# Patient Record
Sex: MALE
[De-identification: names, ages, dates, MRNs, and addresses within clinical notes are randomized per-mention and may not be internally consistent; named-entity substitution may affect disease eponyms.]

---

## 2019-08-05 PROBLEM — Z00.00 ENCOUNTER FOR PREVENTIVE HEALTH EXAMINATION: Status: ACTIVE | Noted: 2019-08-05

## 2019-08-26 ENCOUNTER — APPOINTMENT (OUTPATIENT)
Dept: NEUROLOGY | Facility: CLINIC | Age: 84
End: 2019-08-26
Payer: MEDICARE

## 2019-08-26 VITALS
HEART RATE: 50 BPM | HEIGHT: 72 IN | WEIGHT: 190 LBS | BODY MASS INDEX: 25.73 KG/M2 | SYSTOLIC BLOOD PRESSURE: 138 MMHG | TEMPERATURE: 97.1 F | DIASTOLIC BLOOD PRESSURE: 80 MMHG

## 2019-08-26 DIAGNOSIS — Z80.42 FAMILY HISTORY OF MALIGNANT NEOPLASM OF PROSTATE: ICD-10-CM

## 2019-08-26 DIAGNOSIS — Z80.9 FAMILY HISTORY OF MALIGNANT NEOPLASM, UNSPECIFIED: ICD-10-CM

## 2019-08-26 DIAGNOSIS — Z82.49 FAMILY HISTORY OF ISCHEMIC HEART DISEASE AND OTHER DISEASES OF THE CIRCULATORY SYSTEM: ICD-10-CM

## 2019-08-26 DIAGNOSIS — Z82.3 FAMILY HISTORY OF STROKE: ICD-10-CM

## 2019-08-26 DIAGNOSIS — I51.9 HEART DISEASE, UNSPECIFIED: ICD-10-CM

## 2019-08-26 DIAGNOSIS — G25.0 ESSENTIAL TREMOR: ICD-10-CM

## 2019-08-26 DIAGNOSIS — Z87.39 PERSONAL HISTORY OF OTHER DISEASES OF THE MUSCULOSKELETAL SYSTEM AND CONNECTIVE TISSUE: ICD-10-CM

## 2019-08-26 DIAGNOSIS — H83.2X1 LABYRINTHINE DYSFUNCTION, RIGHT EAR: ICD-10-CM

## 2019-08-26 PROCEDURE — 99205 OFFICE O/P NEW HI 60 MIN: CPT

## 2019-08-26 RX ORDER — APIXABAN 5 MG/1
5 TABLET, FILM COATED ORAL
Refills: 0 | Status: ACTIVE | COMMUNITY

## 2019-08-26 RX ORDER — METOPROLOL TARTRATE 50 MG/1
50 TABLET, FILM COATED ORAL
Refills: 0 | Status: ACTIVE | COMMUNITY

## 2019-08-26 RX ORDER — AMIODARONE HYDROCHLORIDE 200 MG/1
200 TABLET ORAL
Refills: 0 | Status: ACTIVE | COMMUNITY

## 2019-08-26 NOTE — REVIEW OF SYSTEMS
[Dizziness] : dizziness [Vertigo] : vertigo [Negative] : Endocrine [de-identified] : feel off balance, change in vision [FreeTextEntry2] : fatigue, weakness [FreeTextEntry4] : ringing in ears [FreeTextEntry8] : blood in urine, nighttime urination, erectile dysfunction [FreeTextEntry5] : irregular heart beat

## 2019-08-26 NOTE — CONSULT LETTER
[Dear  ___] : Dear  [unfilled], [FreeTextEntry1] : I had the pleasure of evaluating your patient, ROLAND BOWERS, Please see the assessment section below for a summary of my diagnostic impression and plan.\par \par Thank you very much for allowing me to participate in the care of this patient. If you have any questions, please do not hesitate to contact me.\par \par Sincerely,\par \par Dang Mace MD\par \par \par

## 2019-08-26 NOTE — ASSESSMENT
[FreeTextEntry1] : Mr. Fernandez is an 83-year-old man with right vestibular dysfunction.  He has already had a complete workup including ENT evaluation, VNG, MRI brain. He is also receiving the appropriate treatments with vestibular rehabilitation and doing home exercises as well.  Essential tremor and peripheral neuropathy are also contributing to his gait difficulty.  \par He will obtain and bring to us his previous records for my review.\par He will continue vestibular rehabilitation and home exercises.\par

## 2019-08-26 NOTE — PHYSICAL EXAM
[FreeTextEntry1] : Physical examination \par General: No acute distress, Awake, Alert.   \par Fundus: disc margins sharp.   \par Neck: no Carotid bruit.   \par Cardiovascular: Normal rate, Regular rhythm, No murmur.  \par \par Chest - no cardiac implant \par \par Mental status \par Awake, alert, and oriented to person, time and place, Normal attention span and concentration, Recent and remote memory intact, Language intact, Fund of knowledge intact.   \par \par \par Cranial Nerves \par II: VFF  \par III, IV, VI: PERRL, EOMI.   \par V: Facial sensation is normal B/L.   \par VII: Facial strength is normal B/L. \par VIII:Decreased hearing, bilaterally - worse on the left. \par IX, X: Palate is midline and elevates symmetrically.   \par XI: Trapezius normal strength.   \par XII: Tongue midline without atrophy or fasciculations. \par \par Motor exam  \par Muscle tone - cogwheel rigidity.  \par No atrophy or fasciculations \par Muscle Strength: arms and legs, proximal and distal flexors and extensors are normal \par Moderate frequency tremor with action and posture\par \par No UE drift.\par \par Reflexes \par All present, normal, and symmetrical.   \par \par Plantars right: mute.   \par Plantars left: mute.   \par   \par \par Coordination \par Slow, no ataxia - FNF, CARMELA, HKS. \par \par Sensory \par Intact sensation to PP, cold.\par Absent sensation to vibration to the MM, B. \par \par \par Gait \par Unable to perform tandem gait.  \par \par Slow, wide based gait. \par \par

## 2019-08-26 NOTE — HISTORY OF PRESENT ILLNESS
[FreeTextEntry1] : Mr. Fernandez is an 83-year-old man with sudden onset of lightheadedness, vertigo, nausea and vomiting and feeling off balance in December 2018. He had no other neurological symptoms.  He was admitted to [facility].  He was told that an MRI of the brain was normal.  He has been seeing an ENT and had a VNG and has been participating in vestibular rehab and doing home exercises. He has had some mild improvement but sometimes the symptoms worsen. He was told he had right vestibular dysfunction. \par Seven years ago he had painful paresthesias in the right lower leg which improved after lumbar spine surgery. \par He has essential tremor. \par He has PAF on Eliquis. \par